# Patient Record
Sex: FEMALE
[De-identification: names, ages, dates, MRNs, and addresses within clinical notes are randomized per-mention and may not be internally consistent; named-entity substitution may affect disease eponyms.]

---

## 2018-03-15 ENCOUNTER — RESULT REVIEW (OUTPATIENT)
Age: 28
End: 2018-03-15

## 2019-05-06 ENCOUNTER — RESULT REVIEW (OUTPATIENT)
Age: 29
End: 2019-05-06

## 2021-01-26 ENCOUNTER — APPOINTMENT (OUTPATIENT)
Dept: BARIATRICS | Facility: CLINIC | Age: 31
End: 2021-01-26

## 2021-02-02 ENCOUNTER — APPOINTMENT (OUTPATIENT)
Dept: SURGERY | Facility: CLINIC | Age: 31
End: 2021-02-02
Payer: COMMERCIAL

## 2021-02-02 VITALS — BODY MASS INDEX: 51.34 KG/M2 | HEIGHT: 62 IN | WEIGHT: 279 LBS

## 2021-02-02 DIAGNOSIS — F41.0 PANIC DISORDER [EPISODIC PAROXYSMAL ANXIETY]: ICD-10-CM

## 2021-02-02 DIAGNOSIS — I10 ESSENTIAL (PRIMARY) HYPERTENSION: ICD-10-CM

## 2021-02-02 DIAGNOSIS — E78.5 HYPERLIPIDEMIA, UNSPECIFIED: ICD-10-CM

## 2021-02-02 DIAGNOSIS — Z00.00 ENCOUNTER FOR GENERAL ADULT MEDICAL EXAMINATION W/OUT ABNORMAL FINDINGS: ICD-10-CM

## 2021-02-02 DIAGNOSIS — E66.01 MORBID (SEVERE) OBESITY DUE TO EXCESS CALORIES: ICD-10-CM

## 2021-02-02 PROCEDURE — 99204 OFFICE O/P NEW MOD 45 MIN: CPT | Mod: 95

## 2021-02-02 NOTE — HISTORY OF PRESENT ILLNESS
[Home] : at home, [unfilled] , at the time of the visit. [Other Location: e.g. Home (Enter Location, City,State)___] : at [unfilled] [Verbal consent obtained from patient] : the patient, [unfilled] [de-identified] : Pt is a 31 y/o F with pmhx of untreated HLD, HTN, panic attacks who presents today via telehealth amid snowstorm feeling well interested in bariatric sx and here for consultation. States that she has struggled with her wt since childhood, highest recorded weight being 309. Today, verbal wt for pt was obtained of 279. States that her pcp is not treating her HTN or HLD at this time and she is not taking any medications for her panic attacks but it seeing a psychotherapist. Denies n,v,d, abd pain. Admits to occasional heartburn to certain foods. States she has some constipation and developed hemorrhoids last year. Reviewed increasing water and high fiber diet, recommended starting metamucil fiber supplement. States she does not smoke but she has a few glasses of wine per week after work. 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 Reports having 1 child, is single, and livers with her mother who is supportive of her decision to undergo wt loss surgery. Pt expresses interest in VSG, encouraged pt to lose wt preop to make the sleeve a successful surgery for her and to achieve her wt loss goals. Pt understands at her bmi of 51 she would benefit from wt loss preoperatively. Pt to go for sleep study to evaluate any sleep disordered breathing associated with her morbid obesity. \par \par \par \par

## 2021-02-02 NOTE — PLAN
[FreeTextEntry1] : Pt to begin workup for bariatric sx\par Pt informed to lose wt preop as she is interested in VSG, which she understands\par  on sx choice\par

## 2021-04-15 ENCOUNTER — APPOINTMENT (OUTPATIENT)
Dept: RADIOLOGY | Facility: HOSPITAL | Age: 31
End: 2021-04-15

## 2021-04-15 ENCOUNTER — APPOINTMENT (OUTPATIENT)
Dept: ULTRASOUND IMAGING | Facility: HOSPITAL | Age: 31
End: 2021-04-15

## 2021-04-22 ENCOUNTER — APPOINTMENT (OUTPATIENT)
Dept: BARIATRICS | Facility: CLINIC | Age: 31
End: 2021-04-22
Payer: COMMERCIAL

## 2021-04-22 PROCEDURE — 97802 MEDICAL NUTRITION INDIV IN: CPT | Mod: 95
